# Patient Record
Sex: MALE | URBAN - METROPOLITAN AREA
[De-identification: names, ages, dates, MRNs, and addresses within clinical notes are randomized per-mention and may not be internally consistent; named-entity substitution may affect disease eponyms.]

---

## 2023-07-06 ENCOUNTER — OFFICE VISIT (OUTPATIENT)
Facility: LOCATION | Age: 73
End: 2023-07-06
Payer: MEDICARE

## 2023-07-06 DIAGNOSIS — H44.512 ABSOLUTE GLAUCOMA, LEFT EYE: ICD-10-CM

## 2023-07-06 DIAGNOSIS — H25.811 COMBINED FORMS OF AGE-RELATED CATARACT, RIGHT EYE: ICD-10-CM

## 2023-07-06 DIAGNOSIS — H04.123 DRY EYE SYNDROME OF BILATERAL LACRIMAL GLANDS: ICD-10-CM

## 2023-07-06 DIAGNOSIS — H40.1113 PRIMARY OPEN-ANGLE GLAUCOMA RIGHT EYE SEVERE STAGE: Primary | ICD-10-CM

## 2023-07-06 PROCEDURE — 99214 OFFICE O/P EST MOD 30 MIN: CPT | Performed by: OPHTHALMOLOGY

## 2023-07-06 PROCEDURE — 92083 EXTENDED VISUAL FIELD XM: CPT | Performed by: OPHTHALMOLOGY

## 2023-07-06 RX ORDER — NETARSUDIL AND LATANOPROST OPHTHALMIC SOLUTION, 0.02%/0.005% .2; .05 MG/ML; MG/ML
SOLUTION/ DROPS OPHTHALMIC; TOPICAL
Qty: 2.5 | Refills: 6 | Status: INACTIVE
Start: 2023-07-06 | End: 2024-07-04

## 2023-07-06 RX ORDER — BRIMONIDINE TARTRATE 2 MG/MG
0.2 % SOLUTION/ DROPS OPHTHALMIC
Qty: 10 | Refills: 6 | Status: INACTIVE
Start: 2023-07-06 | End: 2024-07-04

## 2023-07-06 RX ORDER — DORZOLAMIDE HYDROCHLORIDE AND TIMOLOL MALEATE 20; 5 MG/ML; MG/ML
SOLUTION/ DROPS OPHTHALMIC
Qty: 10 | Refills: 6 | Status: INACTIVE
Start: 2023-07-06 | End: 2024-07-04

## 2023-07-06 ASSESSMENT — INTRAOCULAR PRESSURE
OS: 0
OD: 22
OD: 15
OD: 17

## 2023-07-06 NOTE — IMPRESSION/PLAN
Impression: 8 month f/u with HVF 10-2 SF OD. Late by 2 months. Per patient, VF OD has been deferred since Glaucoma has no cure he sees no point in testing. Believes that only checking IOP and VA is necessary. POHx: POAG Severe OU, Absolute glaucoma OS, s/p SLT OD 01/2020 with little response 13->11, Possible h/o of ? SLT OU over 10 years ago, no records in our charts, Cataract OD. FOHx: Negative to glaucoma. PMHx: Constipation, Gastric Reflux, Basal Cell Carcinoma, Depression, Allergies. Eye Meds: Alphagan TID OD, Cosopt BID OD, Rocklatan QHS OD. TMAX: 21/- Target IOP: Low teens OD, comfort OS. Plan: Testing:
OCT/ONH 05/2020: OD thin 360, at floor. Stable. HVF 24-2 06/2021: OD only, superior altitudinal/INS. Possible progression (fluctuates). HVF 10-2 1/2022: OD only, SAS>IAS. Stable since 2009, slow progression since 2021. Pachy: 537/576. Gonio 01/2021: OD SS 2+ 360, no view OS. Gonio 11/2022: SS 1+ 360 OU - post dilation. Today:
IOP borderline unacceptable OD - trending up since 2021, comfortable OS. HVF performed and reviewed. Informed patient HVF showing evidence of very slow decline of peripheral vision. Patient denies any changes in right eye vision at this time. Informed patient of confirmed slow trend in IOP OD. Discussed with patient recommendation to lower IOP OD further. Discussed R/B/A/Is of MIGS at time of cataract surgery as next step to lower IOP and improve glaucoma care. Informed patient recommendation to hold off glaucoma filtering surgery at this time. Informed patient option to repeat SLT; however, low chance of any significant IOP reduction. Patient elects to proceed with SLT OD. Plan: 
Continue Alphagan TID OD. Continue Cosopt BID OD. Continue Rocklatan QHS OD. Schedule for SLT OD next available. Will perform HVF 10-2 SF q 4 months per patient request.
Next step CEIOL/OMNI/Hydrus OD.

## 2023-07-06 NOTE — IMPRESSION/PLAN
Impression: Patient dnies pain despite high IOP  He is NLP OS Plan: Stable, continue to monitor. 
Monocular precautions discussed and advised

## 2023-07-06 NOTE — IMPRESSION/PLAN
Impression: Examination revealed dry eye syndrome secondary to tear deficiencies. Plan: Recommend daily use of ATs BID OU.

## 2023-07-06 NOTE — IMPRESSION/PLAN
Impression: NVS, h/o RK Pt has not noticed any change in vision. Plan: Will revisit once visually bothered by cataract.

## 2023-07-13 ENCOUNTER — PROCEDURE (OUTPATIENT)
Facility: LOCATION | Age: 73
End: 2023-07-13
Payer: MEDICARE

## 2023-07-13 DIAGNOSIS — H40.1113 PRIMARY OPEN-ANGLE GLAUCOMA RIGHT EYE SEVERE STAGE: Primary | ICD-10-CM

## 2023-07-13 PROCEDURE — 65855 TRABECULOPLASTY LASER SURG: CPT | Performed by: OPHTHALMOLOGY

## 2023-07-13 ASSESSMENT — INTRAOCULAR PRESSURE
OS: 0
OD: 19

## 2023-07-13 NOTE — IMPRESSION/PLAN
Impression: Patient present today for SLT OD. Per patient, VF OD has been deferred since Glaucoma has no cure he sees no point in testing. Believes that only checking IOP and VA is necessary. POHx: POAG Severe OU, Absolute glaucoma OS, s/p SLT OD 01/2020 with little response 13->11, Possible h/o of ? SLT OU over 10 years ago, no records in our charts, Cataract OD. FOHx: Negative to glaucoma. PMHx: Constipation, Gastric Reflux, Basal Cell Carcinoma, Depression, Allergies. Eye Meds: Alphagan TID OD, Cosopt BID OD, Rocklatan QHS OD. TMAX: 21/- Target IOP: Low teens OD, comfort OS. Plan: Testing:
OCT/ONH 05/2020: OD thin 360, at floor. Stable. HVF 24-2 06/2021: OD only, superior altitudinal/INS. Possible progression (fluctuates). HVF 10-2 1/2022: OD only, SAS>IAS. Stable since 2009, slow progression since 2021. Pachy: 537/576. Gonio 01/2021: OD SS 2+ 360, no view OS. Gonio 11/2022: SS 1+ 360 OU - post dilation. Previous: 
IOP borderline unacceptable OD - trending up since 2021, comfortable OS. HVF performed and reviewed. Informed patient HVF showing evidence of very slow decline of peripheral vision. Patient denies any changes in right eye vision at this time. Informed patient of confirmed slow trend in IOP OD. Discussed with patient recommendation to lower IOP OD further. Discussed R/B/A/Is of MIGS at time of cataract surgery as next step to lower IOP and improve glaucoma care. Informed patient recommendation to hold off glaucoma filtering surgery at this time. Informed patient option to repeat SLT; however, low chance of any significant IOP reduction. Patient elects to proceed with SLT OD. Today: SLT OD performed without any complications. Plan: 
Continue Alphagan TID OD. Continue Cosopt BID OD. Continue Rocklatan QHS OD. RTC in 2 weeks with IOP check. 

Will perform HVF 10-2 SF q 4 months per patient request.

## 2023-07-27 ENCOUNTER — OFFICE VISIT (OUTPATIENT)
Facility: LOCATION | Age: 73
End: 2023-07-27
Payer: MEDICARE

## 2023-07-27 DIAGNOSIS — H40.1113 PRIMARY OPEN-ANGLE GLAUCOMA RIGHT EYE SEVERE STAGE: Primary | ICD-10-CM

## 2023-07-27 PROCEDURE — 99213 OFFICE O/P EST LOW 20 MIN: CPT | Performed by: OPHTHALMOLOGY

## 2023-07-27 ASSESSMENT — INTRAOCULAR PRESSURE: OD: 13

## 2023-08-16 ENCOUNTER — APPOINTMENT (RX ONLY)
Dept: URBAN - METROPOLITAN AREA CLINIC 108 | Facility: CLINIC | Age: 73
Setting detail: DERMATOLOGY
End: 2023-08-16

## 2023-08-16 DIAGNOSIS — L81.4 OTHER MELANIN HYPERPIGMENTATION: ICD-10-CM

## 2023-08-16 DIAGNOSIS — L82.1 OTHER SEBORRHEIC KERATOSIS: ICD-10-CM

## 2023-08-16 DIAGNOSIS — D22 MELANOCYTIC NEVI: ICD-10-CM

## 2023-08-16 DIAGNOSIS — D485 NEOPLASM OF UNCERTAIN BEHAVIOR OF SKIN: ICD-10-CM

## 2023-08-16 DIAGNOSIS — L57.0 ACTINIC KERATOSIS: ICD-10-CM

## 2023-08-16 PROBLEM — D22.62 MELANOCYTIC NEVI OF LEFT UPPER LIMB, INCLUDING SHOULDER: Status: ACTIVE | Noted: 2023-08-16

## 2023-08-16 PROBLEM — D22.61 MELANOCYTIC NEVI OF RIGHT UPPER LIMB, INCLUDING SHOULDER: Status: ACTIVE | Noted: 2023-08-16

## 2023-08-16 PROBLEM — D48.5 NEOPLASM OF UNCERTAIN BEHAVIOR OF SKIN: Status: ACTIVE | Noted: 2023-08-16

## 2023-08-16 PROCEDURE — 11103 TANGNTL BX SKIN EA SEP/ADDL: CPT

## 2023-08-16 PROCEDURE — ? LIQUID NITROGEN

## 2023-08-16 PROCEDURE — 17000 DESTRUCT PREMALG LESION: CPT | Mod: 59

## 2023-08-16 PROCEDURE — ? COUNSELING

## 2023-08-16 PROCEDURE — 11102 TANGNTL BX SKIN SINGLE LES: CPT

## 2023-08-16 PROCEDURE — 99203 OFFICE O/P NEW LOW 30 MIN: CPT | Mod: 25

## 2023-08-16 PROCEDURE — ? BIOPSY BY SHAVE METHOD

## 2023-08-16 PROCEDURE — 17003 DESTRUCT PREMALG LES 2-14: CPT

## 2023-08-16 ASSESSMENT — LOCATION ZONE DERM
LOCATION ZONE: LEG
LOCATION ZONE: TRUNK
LOCATION ZONE: ARM
LOCATION ZONE: ARM
LOCATION ZONE: TRUNK
LOCATION ZONE: LEG

## 2023-08-16 ASSESSMENT — LOCATION SIMPLE DESCRIPTION DERM
LOCATION SIMPLE: LEFT THIGH
LOCATION SIMPLE: RIGHT FOREARM
LOCATION SIMPLE: ABDOMEN
LOCATION SIMPLE: LEFT UPPER BACK
LOCATION SIMPLE: LEFT FOREARM
LOCATION SIMPLE: LEFT THIGH
LOCATION SIMPLE: CHEST
LOCATION SIMPLE: RIGHT CLAVICULAR SKIN
LOCATION SIMPLE: LEFT UPPER ARM
LOCATION SIMPLE: RIGHT FOREARM
LOCATION SIMPLE: LEFT UPPER BACK
LOCATION SIMPLE: RIGHT ELBOW
LOCATION SIMPLE: RIGHT PRETIBIAL REGION
LOCATION SIMPLE: LEFT ELBOW
LOCATION SIMPLE: RIGHT UPPER ARM
LOCATION SIMPLE: RIGHT SHOULDER
LOCATION SIMPLE: RIGHT PRETIBIAL REGION

## 2023-08-16 ASSESSMENT — LOCATION DETAILED DESCRIPTION DERM
LOCATION DETAILED: RIGHT DISTAL PRETIBIAL REGION
LOCATION DETAILED: LEFT DISTAL POSTERIOR UPPER ARM
LOCATION DETAILED: LEFT MEDIAL SUPERIOR CHEST
LOCATION DETAILED: LEFT MID-UPPER BACK
LOCATION DETAILED: LEFT PROXIMAL DORSAL FOREARM
LOCATION DETAILED: RIGHT CLAVICULAR SKIN
LOCATION DETAILED: RIGHT PROXIMAL RADIAL DORSAL FOREARM
LOCATION DETAILED: LEFT PROXIMAL POSTERIOR UPPER ARM
LOCATION DETAILED: LEFT RIB CAGE
LOCATION DETAILED: LEFT DISTAL DORSAL FOREARM
LOCATION DETAILED: LEFT ELBOW
LOCATION DETAILED: RIGHT PROXIMAL RADIAL DORSAL FOREARM
LOCATION DETAILED: LEFT ANTERIOR PROXIMAL THIGH
LOCATION DETAILED: RIGHT POSTERIOR SHOULDER
LOCATION DETAILED: RIGHT PROXIMAL DORSAL FOREARM
LOCATION DETAILED: LEFT MEDIAL UPPER BACK
LOCATION DETAILED: RIGHT PROXIMAL POSTERIOR UPPER ARM
LOCATION DETAILED: LEFT ANTERIOR PROXIMAL THIGH
LOCATION DETAILED: RIGHT ELBOW
LOCATION DETAILED: RIGHT DISTAL POSTERIOR UPPER ARM
LOCATION DETAILED: PERIUMBILICAL SKIN
LOCATION DETAILED: LEFT MID-UPPER BACK
LOCATION DETAILED: RIGHT DISTAL PRETIBIAL REGION
LOCATION DETAILED: EPIGASTRIC SKIN

## 2023-08-16 ASSESSMENT — TOTAL NUMBER OF LESIONS: # OF LESIONS?: 10

## 2023-08-16 NOTE — PROCEDURE: LIQUID NITROGEN
Post-Care Instructions: I reviewed with the patient in detail post-care instructions. Patient is to wear sunprotection, and avoid picking at any of the treated lesions. Pt may apply Vaseline to crusted or scabbing areas.
Number Of Freeze-Thaw Cycles: 3 freeze-thaw cycles
Detail Level: Zone
Render In Bullet Format When Appropriate: No
Total Number Of Aks Treated: 10
Duration Of Freeze Thaw-Cycle (Seconds): 5
Render Post-Care Instructions In Note?: yes
Consent: The patient's consent was obtained including but not limited to risks of crusting, scabbing, blistering, scarring, darker or lighter pigmentary change, recurrence, incomplete removal and infection.

## 2023-08-16 NOTE — PROCEDURE: BIOPSY BY SHAVE METHOD
Body Location Override (Optional - Billing Will Still Be Based On Selected Body Map Location If Applicable): Left lateral thigh
Detail Level: Detailed
Depth Of Biopsy: dermis
Was A Bandage Applied: Yes
Size Of Lesion In Cm: 1.8
X Size Of Lesion In Cm: 1.1
Biopsy Type: H and E
Biopsy Method: Dermablade
Anesthesia Type: 1% lidocaine with epinephrine
Anesthesia Volume In Cc: 0.5
Additional Anesthesia Volume In Cc (Will Not Render If 0): 0
Hemostasis: Electrocautery
Wound Care: Petrolatum
Dressing: no dressing applied
Destruction After The Procedure: No
Type Of Destruction Used: Curettage
Curettage Text: The wound bed was treated with curettage after the biopsy was performed.
Cryotherapy Text: The wound bed was treated with cryotherapy after the biopsy was performed.
Electrodesiccation Text: The wound bed was treated with electrodesiccation after the biopsy was performed.
Electrodesiccation And Curettage Text: The wound bed was treated with electrodesiccation and curettage after the biopsy was performed.
Silver Nitrate Text: The wound bed was treated with silver nitrate after the biopsy was performed.
Lab: [id number]
Path Notes (To The Dermatopathologist): Size: 1.8cm x 1.1cm R/O: BCC vs SCC
Consent: Written consent was obtained and risks were reviewed including but not limited to scarring, infection, bleeding, scabbing, incomplete removal, nerve damage and allergy to anesthesia.
Post-Care Instructions: I reviewed with the patient in detail post-care instructions. Patient is to keep the biopsy site dry overnight, and then apply bacitracin twice daily until healed. Patient may apply hydrogen peroxide soaks to remove any crusting.
Notification Instructions: Patient will be notified of biopsy results. However, patient instructed to call the office if not contacted within 2 weeks.
Billing Type: Third-Party Bill
Information: Selecting Yes will display possible errors in your note based on the variables you have selected. This validation is only offered as a suggestion for you. PLEASE NOTE THAT THE VALIDATION TEXT WILL BE REMOVED WHEN YOU FINALIZE YOUR NOTE. IF YOU WANT TO FAX A PRELIMINARY NOTE YOU WILL NEED TO TOGGLE THIS TO 'NO' IF YOU DO NOT WANT IT IN YOUR FAXED NOTE.
Body Location Override (Optional - Billing Will Still Be Based On Selected Body Map Location If Applicable): Right posterior forearm
Size Of Lesion In Cm: 0.7
Path Notes (To The Dermatopathologist): Size: 0.7cm  R/O: BCC vs SCC
Body Location Override (Optional - Billing Will Still Be Based On Selected Body Map Location If Applicable): Right anterior lower leg
Size Of Lesion In Cm: 0.8
Path Notes (To The Dermatopathologist): Size: 0.8cm R/O: BCC vs SCC
Body Location Override (Optional - Billing Will Still Be Based On Selected Body Map Location If Applicable): left mid back
X Size Of Lesion In Cm: 0.3
Path Notes (To The Dermatopathologist): Size: 0.5cm x 0.3cm R/O: DN

## 2023-08-30 ENCOUNTER — APPOINTMENT (RX ONLY)
Dept: URBAN - METROPOLITAN AREA CLINIC 108 | Facility: CLINIC | Age: 73
Setting detail: DERMATOLOGY
End: 2023-08-30

## 2023-08-30 DIAGNOSIS — L57.0 ACTINIC KERATOSIS: ICD-10-CM

## 2023-08-30 DIAGNOSIS — L82.1 OTHER SEBORRHEIC KERATOSIS: ICD-10-CM

## 2023-08-30 DIAGNOSIS — L89 PRESSURE ULCER: ICD-10-CM

## 2023-08-30 PROBLEM — D23.72 OTHER BENIGN NEOPLASM OF SKIN OF LEFT LOWER LIMB, INCLUDING HIP: Status: ACTIVE | Noted: 2023-08-30

## 2023-08-30 PROBLEM — L89.319 PRESSURE ULCER OF RIGHT BUTTOCK, UNSPECIFIED STAGE: Status: ACTIVE | Noted: 2023-08-30

## 2023-08-30 PROCEDURE — ? PATHOLOGY DISCUSSION

## 2023-08-30 PROCEDURE — ? PRESCRIPTION

## 2023-08-30 PROCEDURE — ? LIQUID NITROGEN

## 2023-08-30 PROCEDURE — 99213 OFFICE O/P EST LOW 20 MIN: CPT | Mod: 25

## 2023-08-30 PROCEDURE — 17003 DESTRUCT PREMALG LES 2-14: CPT

## 2023-08-30 PROCEDURE — ? COUNSELING

## 2023-08-30 PROCEDURE — 17000 DESTRUCT PREMALG LESION: CPT

## 2023-08-30 RX ORDER — SILVER SULFADIAZINE 10 MG/G
CREAM TOPICAL
Qty: 50 | Refills: 0 | Status: ERX | COMMUNITY
Start: 2023-08-30

## 2023-08-30 RX ADMIN — SILVER SULFADIAZINE: 10 CREAM TOPICAL at 00:00

## 2023-08-30 ASSESSMENT — LOCATION DETAILED DESCRIPTION DERM
LOCATION DETAILED: RIGHT PROXIMAL PRETIBIAL REGION
LOCATION DETAILED: LEFT SUPERIOR UPPER BACK
LOCATION DETAILED: RIGHT PROXIMAL DORSAL FOREARM
LOCATION DETAILED: RIGHT PROXIMAL DORSAL FOREARM
LOCATION DETAILED: RIGHT PROXIMAL PRETIBIAL REGION
LOCATION DETAILED: RIGHT MEDIAL BUTTOCK
LOCATION DETAILED: RIGHT MEDIAL BUTTOCK
LOCATION DETAILED: LEFT SUPERIOR UPPER BACK

## 2023-08-30 ASSESSMENT — LOCATION ZONE DERM
LOCATION ZONE: TRUNK
LOCATION ZONE: LEG
LOCATION ZONE: LEG
LOCATION ZONE: ARM
LOCATION ZONE: TRUNK
LOCATION ZONE: ARM

## 2023-08-30 ASSESSMENT — LOCATION SIMPLE DESCRIPTION DERM
LOCATION SIMPLE: RIGHT BUTTOCK
LOCATION SIMPLE: RIGHT BUTTOCK
LOCATION SIMPLE: RIGHT FOREARM
LOCATION SIMPLE: RIGHT FOREARM
LOCATION SIMPLE: LEFT UPPER BACK
LOCATION SIMPLE: LEFT UPPER BACK
LOCATION SIMPLE: RIGHT PRETIBIAL REGION
LOCATION SIMPLE: RIGHT PRETIBIAL REGION

## 2023-08-30 NOTE — PROCEDURE: LIQUID NITROGEN
Consent: The patient's consent was obtained including but not limited to risks of crusting, scabbing, blistering, scarring, darker or lighter pigmentary change, recurrence, incomplete removal and infection.
Post-Care Instructions: I reviewed with the patient in detail post-care instructions. Patient is to wear sunprotection, and avoid picking at any of the treated lesions. Pt may apply Vaseline to crusted or scabbing areas.
Duration Of Freeze Thaw-Cycle (Seconds): 5
Show Applicator Variable?: Yes
Number Of Freeze-Thaw Cycles: 3 freeze-thaw cycles
Render Note In Bullet Format When Appropriate: No
Detail Level: Detailed

## 2023-09-07 ENCOUNTER — OFFICE VISIT (OUTPATIENT)
Facility: LOCATION | Age: 73
End: 2023-09-07
Payer: MEDICARE

## 2023-09-07 DIAGNOSIS — H40.1113 PRIMARY OPEN-ANGLE GLAUCOMA RIGHT EYE SEVERE STAGE: Primary | ICD-10-CM

## 2023-09-07 PROCEDURE — 99213 OFFICE O/P EST LOW 20 MIN: CPT | Performed by: OPHTHALMOLOGY

## 2023-09-07 ASSESSMENT — INTRAOCULAR PRESSURE: OD: 14

## 2023-10-25 ENCOUNTER — APPOINTMENT (RX ONLY)
Dept: URBAN - METROPOLITAN AREA CLINIC 108 | Facility: CLINIC | Age: 73
Setting detail: DERMATOLOGY
End: 2023-10-25

## 2023-10-25 DIAGNOSIS — L89 PRESSURE ULCER: ICD-10-CM | Status: RESOLVED

## 2023-10-25 PROBLEM — L89.319 PRESSURE ULCER OF RIGHT BUTTOCK, UNSPECIFIED STAGE: Status: ACTIVE | Noted: 2023-10-25

## 2023-10-25 PROBLEM — L89.159 PRESSURE ULCER OF SACRAL REGION, UNSPECIFIED STAGE: Status: ACTIVE | Noted: 2023-10-25

## 2023-10-25 PROCEDURE — ? COUNSELING

## 2023-10-25 PROCEDURE — 99212 OFFICE O/P EST SF 10 MIN: CPT

## 2023-10-25 PROCEDURE — ? ADDITIONAL NOTES

## 2023-10-25 ASSESSMENT — LOCATION DETAILED DESCRIPTION DERM
LOCATION DETAILED: RIGHT MEDIAL BUTTOCK
LOCATION DETAILED: GLUTEAL CLEFT

## 2023-10-25 ASSESSMENT — LOCATION ZONE DERM: LOCATION ZONE: TRUNK

## 2023-10-25 ASSESSMENT — LOCATION SIMPLE DESCRIPTION DERM
LOCATION SIMPLE: RIGHT BUTTOCK
LOCATION SIMPLE: GLUTEAL CLEFT

## 2023-10-25 NOTE — PROCEDURE: ADDITIONAL NOTES
Detail Level: Simple
Render Risk Assessment In Note?: no
Additional Notes: Pt is to continue silvadene as needed.

## 2024-01-11 ENCOUNTER — OFFICE VISIT (OUTPATIENT)
Facility: LOCATION | Age: 74
End: 2024-01-11
Payer: MEDICARE

## 2024-01-11 DIAGNOSIS — H04.123 DRY EYE SYNDROME OF BILATERAL LACRIMAL GLANDS: ICD-10-CM

## 2024-01-11 DIAGNOSIS — H40.1113 PRIMARY OPEN-ANGLE GLAUCOMA RIGHT EYE SEVERE STAGE: Primary | ICD-10-CM

## 2024-01-11 DIAGNOSIS — H44.512 ABSOLUTE GLAUCOMA, LEFT EYE: ICD-10-CM

## 2024-01-11 DIAGNOSIS — H25.811 COMBINED FORMS OF AGE-RELATED CATARACT, RIGHT EYE: ICD-10-CM

## 2024-01-11 PROCEDURE — 92020 GONIOSCOPY: CPT | Performed by: OPHTHALMOLOGY

## 2024-01-11 PROCEDURE — 99214 OFFICE O/P EST MOD 30 MIN: CPT | Performed by: OPHTHALMOLOGY

## 2024-01-11 PROCEDURE — 92083 EXTENDED VISUAL FIELD XM: CPT | Performed by: OPHTHALMOLOGY

## 2024-01-11 RX ORDER — BRIMONIDINE TARTRATE 2 MG/MG
0.2 % SOLUTION/ DROPS OPHTHALMIC
Qty: 10 | Refills: 6 | Status: INACTIVE
Start: 2024-01-11 | End: 2025-01-09

## 2024-01-11 RX ORDER — NETARSUDIL AND LATANOPROST OPHTHALMIC SOLUTION, 0.02%/0.005% .2; .05 MG/ML; MG/ML
SOLUTION/ DROPS OPHTHALMIC; TOPICAL
Qty: 2.5 | Refills: 6 | Status: INACTIVE
Start: 2024-01-11 | End: 2025-01-09

## 2024-01-11 RX ORDER — DORZOLAMIDE HYDROCHLORIDE AND TIMOLOL MALEATE 20; 5 MG/ML; MG/ML
SOLUTION/ DROPS OPHTHALMIC
Qty: 10 | Refills: 6 | Status: INACTIVE
Start: 2024-01-11 | End: 2025-01-09

## 2024-01-11 ASSESSMENT — INTRAOCULAR PRESSURE
OS: 0
OD: 14

## 2024-04-11 ENCOUNTER — OFFICE VISIT (OUTPATIENT)
Facility: LOCATION | Age: 74
End: 2024-04-11
Payer: MEDICARE

## 2024-04-11 DIAGNOSIS — H40.1113 PRIMARY OPEN-ANGLE GLAUCOMA RIGHT EYE SEVERE STAGE: Primary | ICD-10-CM

## 2024-04-11 DIAGNOSIS — H25.811 COMBINED FORMS OF AGE-RELATED CATARACT, RIGHT EYE: ICD-10-CM

## 2024-04-11 DIAGNOSIS — H44.512 ABSOLUTE GLAUCOMA, LEFT EYE: ICD-10-CM

## 2024-04-11 PROCEDURE — 99214 OFFICE O/P EST MOD 30 MIN: CPT | Performed by: OPHTHALMOLOGY

## 2024-04-11 ASSESSMENT — INTRAOCULAR PRESSURE
OD: 15
OD: 32

## 2024-05-10 ENCOUNTER — OFFICE VISIT (OUTPATIENT)
Facility: LOCATION | Age: 74
End: 2024-05-10
Payer: MEDICARE

## 2024-05-10 DIAGNOSIS — H44.512 ABSOLUTE GLAUCOMA, LEFT EYE: ICD-10-CM

## 2024-05-10 DIAGNOSIS — H40.1113 PRIMARY OPEN-ANGLE GLAUCOMA RIGHT EYE SEVERE STAGE: Primary | ICD-10-CM

## 2024-05-10 DIAGNOSIS — H26.493 OTHER SECONDARY CATARACT, BILATERAL: ICD-10-CM

## 2024-05-10 PROCEDURE — 66821 AFTER CATARACT LASER SURGERY: CPT | Performed by: OPHTHALMOLOGY

## 2024-05-10 PROCEDURE — 92083 EXTENDED VISUAL FIELD XM: CPT | Performed by: OPHTHALMOLOGY

## 2024-05-10 ASSESSMENT — INTRAOCULAR PRESSURE
OD: 31
OD: 22
OD: 29
OD: 26

## 2024-05-24 ENCOUNTER — OFFICE VISIT (OUTPATIENT)
Facility: LOCATION | Age: 74
End: 2024-05-24
Payer: MEDICARE

## 2024-05-24 DIAGNOSIS — H40.1113 PRIMARY OPEN-ANGLE GLAUCOMA RIGHT EYE SEVERE STAGE: Primary | ICD-10-CM

## 2024-05-24 DIAGNOSIS — H25.811 COMBINED FORMS OF AGE-RELATED CATARACT, RIGHT EYE: ICD-10-CM

## 2024-05-24 DIAGNOSIS — H44.512 ABSOLUTE GLAUCOMA, LEFT EYE: ICD-10-CM

## 2024-05-24 DIAGNOSIS — H04.123 DRY EYE SYNDROME OF BILATERAL LACRIMAL GLANDS: ICD-10-CM

## 2024-05-24 PROCEDURE — 99024 POSTOP FOLLOW-UP VISIT: CPT | Performed by: OPHTHALMOLOGY

## 2024-05-24 ASSESSMENT — INTRAOCULAR PRESSURE
OD: 31
OD: 33

## 2024-06-20 ENCOUNTER — OFFICE VISIT (OUTPATIENT)
Facility: LOCATION | Age: 74
End: 2024-06-20
Payer: MEDICARE

## 2024-06-20 DIAGNOSIS — H04.123 DRY EYE SYNDROME OF BILATERAL LACRIMAL GLANDS: ICD-10-CM

## 2024-06-20 DIAGNOSIS — H44.512 ABSOLUTE GLAUCOMA, LEFT EYE: ICD-10-CM

## 2024-06-20 DIAGNOSIS — H40.1113 PRIMARY OPEN-ANGLE GLAUCOMA RIGHT EYE SEVERE STAGE: Primary | ICD-10-CM

## 2024-06-20 DIAGNOSIS — H25.811 COMBINED FORMS OF AGE-RELATED CATARACT, RIGHT EYE: ICD-10-CM

## 2024-06-20 PROCEDURE — 99213 OFFICE O/P EST LOW 20 MIN: CPT | Performed by: OPHTHALMOLOGY

## 2024-06-20 RX ORDER — KETOROLAC TROMETHAMINE 5 MG/ML
0.5 % SOLUTION OPHTHALMIC
Qty: 10 | Refills: 1 | Status: ACTIVE
Start: 2024-06-20

## 2024-06-20 RX ORDER — OFLOXACIN 3 MG/ML
0.3 % SOLUTION/ DROPS OPHTHALMIC
Qty: 3 | Refills: 1 | Status: ACTIVE
Start: 2024-06-20

## 2024-06-20 RX ORDER — PREDNISOLONE ACETATE 10 MG/ML
1 % SUSPENSION/ DROPS OPHTHALMIC
Qty: 10 | Refills: 1 | Status: ACTIVE
Start: 2024-06-20

## 2024-06-20 RX ORDER — ACETAZOLAMIDE 500 MG/1
500 MG CAPSULE, EXTENDED RELEASE ORAL
Qty: 42 | Refills: 0 | Status: ACTIVE
Start: 2024-06-20

## 2024-06-20 ASSESSMENT — VISUAL ACUITY: OD: 20/30

## 2024-06-20 ASSESSMENT — INTRAOCULAR PRESSURE
OD: 33
OS: 55

## 2024-07-02 ENCOUNTER — POST-OPERATIVE VISIT (OUTPATIENT)
Facility: LOCATION | Age: 74
End: 2024-07-02
Payer: MEDICARE

## 2024-07-02 DIAGNOSIS — Z48.810 ENCOUNTER FOR SURGICAL AFTERCARE FOLLOWING SURGERY ON A SENSE ORGAN: Primary | ICD-10-CM

## 2024-07-02 DIAGNOSIS — H40.1113 PRIMARY OPEN-ANGLE GLAUCOMA RIGHT EYE SEVERE STAGE: ICD-10-CM

## 2024-07-02 PROCEDURE — 99024 POSTOP FOLLOW-UP VISIT: CPT | Performed by: OPTOMETRIST

## 2024-07-02 ASSESSMENT — INTRAOCULAR PRESSURE
OD: 10
OD: 26

## 2024-07-12 ENCOUNTER — POST-OPERATIVE VISIT (OUTPATIENT)
Facility: LOCATION | Age: 74
End: 2024-07-12
Payer: MEDICARE

## 2024-07-12 DIAGNOSIS — Z48.810 ENCOUNTER FOR SURGICAL AFTERCARE FOLLOWING SURGERY ON A SENSE ORGAN: Primary | ICD-10-CM

## 2024-07-12 PROCEDURE — 99024 POSTOP FOLLOW-UP VISIT: CPT | Performed by: OPHTHALMOLOGY

## 2024-07-12 RX ORDER — BRIMONIDINE TARTRATE 2 MG/MG
0.2 % SOLUTION/ DROPS OPHTHALMIC
Qty: 10 | Refills: 6 | Status: INACTIVE
Start: 2024-07-12 | End: 2025-07-11

## 2024-07-12 RX ORDER — DORZOLAMIDE HYDROCHLORIDE AND TIMOLOL MALEATE 20; 5 MG/ML; MG/ML
SOLUTION/ DROPS OPHTHALMIC
Qty: 10 | Refills: 6 | Status: INACTIVE
Start: 2024-07-12 | End: 2025-07-11

## 2024-07-12 RX ORDER — NETARSUDIL AND LATANOPROST OPHTHALMIC SOLUTION, 0.02%/0.005% .2; .05 MG/ML; MG/ML
SOLUTION/ DROPS OPHTHALMIC; TOPICAL
Qty: 2.5 | Refills: 6 | Status: INACTIVE
Start: 2024-07-12 | End: 2025-07-11

## 2024-07-12 ASSESSMENT — INTRAOCULAR PRESSURE
OD: 12
OD: 23
OD: 8
OD: 26

## 2024-08-02 ENCOUNTER — POST-OPERATIVE VISIT (OUTPATIENT)
Facility: LOCATION | Age: 74
End: 2024-08-02
Payer: MEDICARE

## 2024-08-02 DIAGNOSIS — Z96.1 PRESENCE OF INTRAOCULAR LENS: Primary | ICD-10-CM

## 2024-08-02 PROCEDURE — 99024 POSTOP FOLLOW-UP VISIT: CPT | Performed by: OPHTHALMOLOGY

## 2024-08-02 ASSESSMENT — INTRAOCULAR PRESSURE
OD: 33
OD: 7
OD: 11

## 2024-08-30 ENCOUNTER — POST-OPERATIVE VISIT (OUTPATIENT)
Facility: LOCATION | Age: 74
End: 2024-08-30
Payer: MEDICARE

## 2024-08-30 DIAGNOSIS — Z96.1 PRESENCE OF INTRAOCULAR LENS: Primary | ICD-10-CM

## 2024-08-30 PROCEDURE — 99024 POSTOP FOLLOW-UP VISIT: CPT | Performed by: OPHTHALMOLOGY

## 2024-08-30 ASSESSMENT — INTRAOCULAR PRESSURE
OD: 26
OD: 12
OD: 11

## 2024-08-30 ASSESSMENT — VISUAL ACUITY: OD: 20/25

## 2024-09-20 ENCOUNTER — OFFICE VISIT (OUTPATIENT)
Facility: LOCATION | Age: 74
End: 2024-09-20
Payer: MEDICARE

## 2024-09-20 DIAGNOSIS — H40.1113 PRIMARY OPEN-ANGLE GLAUCOMA RIGHT EYE SEVERE STAGE: ICD-10-CM

## 2024-09-20 DIAGNOSIS — Z96.1 PRESENCE OF INTRAOCULAR LENS: Primary | ICD-10-CM

## 2024-09-20 PROCEDURE — 99024 POSTOP FOLLOW-UP VISIT: CPT | Performed by: OPHTHALMOLOGY

## 2024-09-20 ASSESSMENT — VISUAL ACUITY: OD: 20/150

## 2024-09-27 ENCOUNTER — POST-OPERATIVE VISIT (OUTPATIENT)
Facility: LOCATION | Age: 74
End: 2024-09-27
Payer: MEDICARE

## 2024-09-27 DIAGNOSIS — Z48.810 ENCOUNTER FOR SURGICAL AFTERCARE FOLLOWING SURGERY ON A SENSE ORGAN: ICD-10-CM

## 2024-09-27 DIAGNOSIS — H54.8 LEGAL BLINDNESS, AS DEFINED IN USA: Primary | ICD-10-CM

## 2024-09-27 DIAGNOSIS — H47.011 ISCHEMIC OPTIC NEUROPATHY, RIGHT EYE: ICD-10-CM

## 2024-09-27 DIAGNOSIS — H34.11 CENTRAL RETINAL ARTERY OCCLUSION, RIGHT EYE: ICD-10-CM

## 2024-09-27 PROCEDURE — 99024 POSTOP FOLLOW-UP VISIT: CPT | Performed by: OPHTHALMOLOGY

## 2024-09-27 ASSESSMENT — INTRAOCULAR PRESSURE: OD: 12

## 2024-09-27 ASSESSMENT — VISUAL ACUITY: OD: 20/200
